# Patient Record
Sex: MALE | Race: WHITE | Employment: FULL TIME | URBAN - METROPOLITAN AREA
[De-identification: names, ages, dates, MRNs, and addresses within clinical notes are randomized per-mention and may not be internally consistent; named-entity substitution may affect disease eponyms.]

---

## 2017-03-21 ENCOUNTER — OFFICE VISIT (OUTPATIENT)
Dept: FAMILY MEDICINE CLINIC | Age: 52
End: 2017-03-21

## 2017-03-21 VITALS
WEIGHT: 177.6 LBS | BODY MASS INDEX: 25.43 KG/M2 | HEIGHT: 70 IN | SYSTOLIC BLOOD PRESSURE: 118 MMHG | HEART RATE: 74 BPM | DIASTOLIC BLOOD PRESSURE: 82 MMHG | OXYGEN SATURATION: 98 %

## 2017-03-21 DIAGNOSIS — Z00.00 PREVENTATIVE HEALTH CARE: Primary | ICD-10-CM

## 2017-03-21 DIAGNOSIS — Z13.1 SCREENING FOR DIABETES MELLITUS (DM): ICD-10-CM

## 2017-03-21 LAB — HBA1C MFR BLD: 5.3 %

## 2017-03-21 PROCEDURE — 99386 PREV VISIT NEW AGE 40-64: CPT | Performed by: FAMILY MEDICINE

## 2017-03-21 PROCEDURE — 83036 HEMOGLOBIN GLYCOSYLATED A1C: CPT | Performed by: FAMILY MEDICINE

## 2017-03-21 ASSESSMENT — ENCOUNTER SYMPTOMS: SHORTNESS OF BREATH: 0

## 2017-03-22 LAB
A/G RATIO: 2.1 (ref 1.1–2.2)
ALBUMIN SERPL-MCNC: 4.8 G/DL (ref 3.4–5)
ALP BLD-CCNC: 88 U/L (ref 40–129)
ALT SERPL-CCNC: 35 U/L (ref 10–40)
ANION GAP SERPL CALCULATED.3IONS-SCNC: 21 MMOL/L (ref 3–16)
AST SERPL-CCNC: 25 U/L (ref 15–37)
BILIRUB SERPL-MCNC: 1 MG/DL (ref 0–1)
BUN BLDV-MCNC: 14 MG/DL (ref 7–20)
CALCIUM SERPL-MCNC: 9.5 MG/DL (ref 8.3–10.6)
CHLORIDE BLD-SCNC: 98 MMOL/L (ref 99–110)
CHOLESTEROL, TOTAL: 208 MG/DL (ref 0–199)
CO2: 24 MMOL/L (ref 21–32)
CREAT SERPL-MCNC: 1.1 MG/DL (ref 0.9–1.3)
GFR AFRICAN AMERICAN: >60
GFR NON-AFRICAN AMERICAN: >60
GLOBULIN: 2.3 G/DL
GLUCOSE BLD-MCNC: 107 MG/DL (ref 70–99)
HDLC SERPL-MCNC: 41 MG/DL (ref 40–60)
HIV-1 AND HIV-2 ANTIBODIES: NORMAL
LDL CHOLESTEROL CALCULATED: 145 MG/DL
POTASSIUM SERPL-SCNC: 4 MMOL/L (ref 3.5–5.1)
SODIUM BLD-SCNC: 143 MMOL/L (ref 136–145)
TOTAL PROTEIN: 7.1 G/DL (ref 6.4–8.2)
TRIGL SERPL-MCNC: 108 MG/DL (ref 0–150)
VLDLC SERPL CALC-MCNC: 22 MG/DL

## 2018-02-09 ENCOUNTER — HOSPITAL ENCOUNTER (OUTPATIENT)
Dept: OTHER | Age: 53
Discharge: HOME | End: 2018-02-09
Attending: INTERNAL MEDICINE | Admitting: INTERNAL MEDICINE

## 2018-02-09 VITALS
SYSTOLIC BLOOD PRESSURE: 123 MMHG | OXYGEN SATURATION: 97 % | TEMPERATURE: 98.5 F | WEIGHT: 175 LBS | HEIGHT: 70 IN | HEART RATE: 72 BPM | RESPIRATION RATE: 16 BRPM | DIASTOLIC BLOOD PRESSURE: 84 MMHG | BODY MASS INDEX: 25.05 KG/M2

## 2018-02-09 RX ORDER — SODIUM CHLORIDE, SODIUM LACTATE, POTASSIUM CHLORIDE, CALCIUM CHLORIDE 600; 310; 30; 20 MG/100ML; MG/100ML; MG/100ML; MG/100ML
INJECTION, SOLUTION INTRAVENOUS ONCE
Status: DISCONTINUED | OUTPATIENT
Start: 2018-02-09 | End: 2018-02-10 | Stop reason: HOSPADM

## 2018-02-09 ASSESSMENT — PAIN - FUNCTIONAL ASSESSMENT: PAIN_FUNCTIONAL_ASSESSMENT: 0-10

## 2018-02-09 ASSESSMENT — PAIN SCALES - GENERAL
PAINLEVEL_OUTOF10: 0

## 2018-02-09 NOTE — PROGRESS NOTES
Pt discharged to home in stable condition. Verbal and written discharge instructions given pt and wife verbalized understanding.

## 2018-02-09 NOTE — PLAN OF CARE
ENDOSCOPY  PRE, INTRA, & POST PROCEDURAL  CARE PLAN    HEMODYNAMIC STATUS  INTERDISCIPLINARY   Goal: Hemodynamic Status to Baseline / Discharge Criteria Met  Interventions     1. Obtain Patient  Medical /  Surgical History     1 Assess & Review Allergies Prior to Endo & All  Meds (PRN)     2. Assess / Monitor Vital Signs / LOC (PRN). Intra Procedure VS/ LOC  Q5 Mins  & As Per Policy Until Discharge. 3. Obtain Baseline Maribel & Post Procedural  Maribel Per   Policy     4. Check Monitors, Alarms On     5. Patient Re Assessed by Physician     6. Monitor  I&O Post Procedure   NURSING   SAFETY & PSYCHO SOCIAL  INTERDISCIPLINARY   Goal: Patient Returns to Baseline Activity/ Meets Discharge Criteria  Interventions     1. Greet Patient with ID Badge/ Picture in View (PRN)     2. Be Available & Sensitive to Patients Needs (PRN)     3. Communicate referral to Pastoral Care as Appropriate (PRN)     4. Provide Age Specific Measures (PRN)     4. Admission Data Base Reviewed     5. Administer Meds Per Orders (PRN)     5. Maintain Baseline Activity  Or Activity as Ordered Per Physician     NUTRITION   INTERDISCIPLINARY   Goal: Patient to baseline/ Improved Nutrition  Interventions     1. Assess NPO Status / Notify Physician if Necessary (PRN)     2. NPO per Physician Orders     3. Assess Nutritional Status (PRN)     4. Assess Ability to Swallow as Indicated     LAB & DIAGNOSTICS   Goal: Additional Tests per Physicians   Orders  Interventions     1. Lab & Diagnostics per Physician Orders (PRN)     2.  Obtain  Urine / Serum HCG & FSBS On All Diabetic Patients  Per Policy & (PRN)     3. Assess Lab Time Out  for  Patient Safety as Needed (PRN)   RESPIRATORY  INTERDISCIPLINARY   Goal: Airway   Patency, SAO2   Saturation, Cough mechanism Maintained   Interventions     1. Evaluate Bilateral Breath Sounds  Baseline, (PRN), & Prior to Discharge     2. Weight & Height Noted ( PRN)     3.   Assess Baseline SPo2 (PRN)

## 2018-02-09 NOTE — PROCEDURES
Gordan Bumpers   2/9/2018  Colonoscopy  A pre-procedure re-evaluation was performed immediately prior to the procedure.   Preprocedure Dx: v76.51  Postprocedure Dx: Normal Colonoscopy  Medications: Procedural sedation with Versed & Fentanyl  Complications: None  Estimated Blood Loss: <5cc  Specimens: were not obtained  Suzie Vilchis

## 2018-02-09 NOTE — H&P
Pre-sedation Assessment    HPI: screening  Nurses notes reviewed and agreed. Medications and Allergies reviewed    Physical Exam:  Airway:Normal  Cardiac:Normal  Pulmonary:Normal  Abdomen:Normal    Assessment/Plan:  ASA Grade 2 - Patient with mild systemic disease with no functional limitations  Level of Sedation Plan: Moderate sedation  Post Procedure plan: Return to same level of care  I have explained the risk, benefits, and alternatives to the procedure. The patient understands and agrees to proceed.   Yes    Winter De Los Santos  11:19 AM 2/9/2018

## 2018-02-09 NOTE — OP NOTE
[address]                  [address] [address]                                 OPERATIVE REPORT    PATIENT NAME: Natty Rizvi                     :        1965  MED REC NO:   [id number]                          ROOM:  ACCOUNT NO:   [de-identified]                          ADMIT DATE: 2018  PROVIDER:     Gian Montesinos MD    DATE OF PROCEDURE:  2018    PREOPERATIVE DIAGNOSIS:  Screening. POSTOPERATIVE DIAGNOSIS:  Normal colon to cecum. PROCEDURE DONE:  Colonoscopy to cecum. SURGEON:  Gian Montesinos MD    INDICATIONS:  This is a 53-year-old referred for screening colonoscopy. Denied any symptoms. Informed consent was obtained. IV sedation was given  with fentanyl and Versed. Continuous cardiac, oxygen, and blood pressure  monitoring done. FINDINGS:  RECTUM:  Normal.  SIGMOID COLON:  Normal.  DESCENDING COLON:  Normal.  TRANSVERSE COLON:  Normal.  ASCENDING COLON TO CECUM:  Normal.    While coming out, the colon was again examined. No other lesions were  seen. IMPRESSION:  Normal colon to cecum. SUGGESTIONS:  Would be to go ahead and have the patient follow up with Dr. Mirna Soares. We will consider repeat colonoscopy in 10 years.         Darcy Jerez MD    D: 2018 12:44:01       T: 2018 13:39:33     [email or web address]  Job#: [id number]     Doc#: [id number]    CC:  Gian Montesinos MD       [address] [place] [address]

## 2018-03-06 ENCOUNTER — OFFICE VISIT (OUTPATIENT)
Dept: FAMILY MEDICINE CLINIC | Age: 53
End: 2018-03-06

## 2018-03-06 VITALS
WEIGHT: 182 LBS | BODY MASS INDEX: 26.11 KG/M2 | HEART RATE: 68 BPM | SYSTOLIC BLOOD PRESSURE: 122 MMHG | OXYGEN SATURATION: 99 % | DIASTOLIC BLOOD PRESSURE: 84 MMHG

## 2018-03-06 DIAGNOSIS — Z00.00 PREVENTATIVE HEALTH CARE: Primary | ICD-10-CM

## 2018-03-06 PROCEDURE — 99396 PREV VISIT EST AGE 40-64: CPT | Performed by: FAMILY MEDICINE

## 2018-03-06 ASSESSMENT — PATIENT HEALTH QUESTIONNAIRE - PHQ9
SUM OF ALL RESPONSES TO PHQ9 QUESTIONS 1 & 2: 2
SUM OF ALL RESPONSES TO PHQ QUESTIONS 1-9: 2
1. LITTLE INTEREST OR PLEASURE IN DOING THINGS: 1
2. FEELING DOWN, DEPRESSED OR HOPELESS: 1

## 2018-03-06 NOTE — PROGRESS NOTES
Taylor Hoffman is a 48 y.o. male    Chief Complaint   Patient presents with    Annual Exam       HPI:    HPI    Preventative care. Tdap: UTD  Shingles vaccine: will get at local pharmacy  HIV screen was negative. Cholesterol was modestly elevated. A1c was 5.3. Colonoscopy was normal with recommendations to repeat in 10 years. ROS:    ROS    /84   Pulse 68   Wt 182 lb (82.6 kg)   SpO2 99%   BMI 26.11 kg/m²     Physical Exam:    Physical Exam   Constitutional: He appears well-developed. No distress. Cardiovascular: Normal rate and regular rhythm. No murmur heard. Pulmonary/Chest: Effort normal and breath sounds normal. No respiratory distress. Skin: He is not diaphoretic. Psychiatric: He has a normal mood and affect. No current outpatient prescriptions on file. No current facility-administered medications for this visit. Assessment:    1. Preventative health care        Plan:    1. Preventative health care  Will try to get shingles vaccine at local pharmacy. Return in about 1 year (around 3/6/2019) for Preventative.

## 2018-08-05 ENCOUNTER — HOSPITAL ENCOUNTER (EMERGENCY)
Age: 53
Discharge: HOME | End: 2018-08-05
Attending: EMERGENCY MEDICINE
Payer: COMMERCIAL

## 2018-08-05 ENCOUNTER — APPOINTMENT (OUTPATIENT)
Dept: CT IMAGING | Age: 53
End: 2018-08-05
Payer: COMMERCIAL

## 2018-08-05 VITALS
HEART RATE: 62 BPM | OXYGEN SATURATION: 97 % | RESPIRATION RATE: 16 BRPM | HEIGHT: 70 IN | SYSTOLIC BLOOD PRESSURE: 115 MMHG | TEMPERATURE: 97.9 F | BODY MASS INDEX: 25.77 KG/M2 | WEIGHT: 180 LBS | DIASTOLIC BLOOD PRESSURE: 66 MMHG

## 2018-08-05 DIAGNOSIS — N20.0 KIDNEY STONE: Primary | ICD-10-CM

## 2018-08-05 LAB
A/G RATIO: 1.7 (ref 1.1–2.2)
ALBUMIN SERPL-MCNC: 4.4 G/DL (ref 3.4–5)
ALP BLD-CCNC: 100 U/L (ref 40–129)
ALT SERPL-CCNC: 18 U/L (ref 10–40)
ANION GAP SERPL CALCULATED.3IONS-SCNC: 12 MMOL/L (ref 3–16)
AST SERPL-CCNC: 19 U/L (ref 15–37)
BACTERIA: ABNORMAL /HPF
BILIRUB SERPL-MCNC: 0.7 MG/DL (ref 0–1)
BILIRUBIN URINE: NEGATIVE
BLOOD, URINE: ABNORMAL
BUN BLDV-MCNC: 17 MG/DL (ref 7–20)
CALCIUM SERPL-MCNC: 9.2 MG/DL (ref 8.3–10.6)
CHLORIDE BLD-SCNC: 102 MMOL/L (ref 99–110)
CLARITY: CLEAR
CO2: 28 MMOL/L (ref 21–32)
COLOR: YELLOW
CREAT SERPL-MCNC: 1.3 MG/DL (ref 0.9–1.3)
EPITHELIAL CELLS, UA: ABNORMAL /HPF
GFR AFRICAN AMERICAN: >60
GFR NON-AFRICAN AMERICAN: 58
GLOBULIN: 2.6 G/DL
GLUCOSE BLD-MCNC: 144 MG/DL (ref 70–99)
GLUCOSE URINE: NEGATIVE MG/DL
KETONES, URINE: NEGATIVE MG/DL
LEUKOCYTE ESTERASE, URINE: NEGATIVE
MICROSCOPIC EXAMINATION: YES
NITRITE, URINE: NEGATIVE
PH UA: 6
POTASSIUM SERPL-SCNC: 4.1 MMOL/L (ref 3.5–5.1)
PROTEIN UA: 30 MG/DL
RBC UA: >100 /HPF (ref 0–2)
SODIUM BLD-SCNC: 142 MMOL/L (ref 136–145)
SPECIFIC GRAVITY UA: >=1.03
TOTAL PROTEIN: 7 G/DL (ref 6.4–8.2)
URINE TYPE: ABNORMAL
UROBILINOGEN, URINE: 0.2 E.U./DL
WBC UA: ABNORMAL /HPF (ref 0–5)

## 2018-08-05 PROCEDURE — 99284 EMERGENCY DEPT VISIT MOD MDM: CPT

## 2018-08-05 PROCEDURE — 6370000000 HC RX 637 (ALT 250 FOR IP): Performed by: EMERGENCY MEDICINE

## 2018-08-05 PROCEDURE — 80053 COMPREHEN METABOLIC PANEL: CPT

## 2018-08-05 PROCEDURE — 81001 URINALYSIS AUTO W/SCOPE: CPT

## 2018-08-05 PROCEDURE — 74176 CT ABD & PELVIS W/O CONTRAST: CPT

## 2018-08-05 PROCEDURE — 2580000003 HC RX 258: Performed by: EMERGENCY MEDICINE

## 2018-08-05 PROCEDURE — 96361 HYDRATE IV INFUSION ADD-ON: CPT

## 2018-08-05 PROCEDURE — 96375 TX/PRO/DX INJ NEW DRUG ADDON: CPT

## 2018-08-05 PROCEDURE — 6360000002 HC RX W HCPCS: Performed by: EMERGENCY MEDICINE

## 2018-08-05 PROCEDURE — 96374 THER/PROPH/DIAG INJ IV PUSH: CPT

## 2018-08-05 RX ORDER — HYDROCODONE BITARTRATE AND ACETAMINOPHEN 5; 325 MG/1; MG/1
1 TABLET ORAL EVERY 6 HOURS PRN
Qty: 8 TABLET | Refills: 0 | Status: SHIPPED | OUTPATIENT
Start: 2018-08-05 | End: 2018-08-07

## 2018-08-05 RX ORDER — ONDANSETRON 8 MG/1
8 TABLET, ORALLY DISINTEGRATING ORAL EVERY 8 HOURS PRN
Qty: 10 TABLET | Refills: 0 | Status: SHIPPED | OUTPATIENT
Start: 2018-08-05

## 2018-08-05 RX ORDER — TAMSULOSIN HYDROCHLORIDE 0.4 MG/1
0.4 CAPSULE ORAL DAILY
Qty: 5 CAPSULE | Refills: 0 | Status: SHIPPED | OUTPATIENT
Start: 2018-08-05

## 2018-08-05 RX ORDER — KETOROLAC TROMETHAMINE 30 MG/ML
15 INJECTION, SOLUTION INTRAMUSCULAR; INTRAVENOUS ONCE
Status: COMPLETED | OUTPATIENT
Start: 2018-08-05 | End: 2018-08-05

## 2018-08-05 RX ORDER — MORPHINE SULFATE 1 MG/ML
4 INJECTION, SOLUTION EPIDURAL; INTRATHECAL; INTRAVENOUS ONCE
Status: COMPLETED | OUTPATIENT
Start: 2018-08-05 | End: 2018-08-05

## 2018-08-05 RX ORDER — 0.9 % SODIUM CHLORIDE 0.9 %
1000 INTRAVENOUS SOLUTION INTRAVENOUS ONCE
Status: COMPLETED | OUTPATIENT
Start: 2018-08-05 | End: 2018-08-05

## 2018-08-05 RX ORDER — CEPHALEXIN 250 MG/1
500 CAPSULE ORAL 4 TIMES DAILY
Qty: 56 CAPSULE | Refills: 0 | Status: SHIPPED | OUTPATIENT
Start: 2018-08-05 | End: 2018-08-12

## 2018-08-05 RX ORDER — ONDANSETRON 2 MG/ML
4 INJECTION INTRAMUSCULAR; INTRAVENOUS EVERY 6 HOURS PRN
Status: DISCONTINUED | OUTPATIENT
Start: 2018-08-05 | End: 2018-08-05 | Stop reason: HOSPADM

## 2018-08-05 RX ORDER — CEPHALEXIN 500 MG/1
500 CAPSULE ORAL ONCE
Status: COMPLETED | OUTPATIENT
Start: 2018-08-05 | End: 2018-08-05

## 2018-08-05 RX ADMIN — KETOROLAC TROMETHAMINE 15 MG: 30 INJECTION, SOLUTION INTRAMUSCULAR at 09:05

## 2018-08-05 RX ADMIN — Medication 4 MG: at 09:05

## 2018-08-05 RX ADMIN — ONDANSETRON 4 MG: 2 INJECTION INTRAMUSCULAR; INTRAVENOUS at 09:05

## 2018-08-05 RX ADMIN — SODIUM CHLORIDE 1000 ML: 9 INJECTION, SOLUTION INTRAVENOUS at 09:38

## 2018-08-05 RX ADMIN — CEPHALEXIN 500 MG: 500 CAPSULE ORAL at 11:14

## 2018-08-05 ASSESSMENT — PAIN SCALES - GENERAL
PAINLEVEL_OUTOF10: 9
PAINLEVEL_OUTOF10: 0
PAINLEVEL_OUTOF10: 9

## 2018-08-05 NOTE — ED PROVIDER NOTES
person, place, and time. He appears well-developed and well-nourished. No distress. HENT:   Head: Normocephalic and atraumatic. Mouth/Throat: Oropharynx is clear and moist.   Eyes: EOM are normal. Pupils are equal, round, and reactive to light. Right eye exhibits no discharge. Left eye exhibits no discharge. Neck: Normal range of motion. Neck supple. No tracheal deviation present. Cardiovascular: Normal rate, regular rhythm, normal heart sounds and intact distal pulses. Exam reveals no gallop and no friction rub. No murmur heard. Pulmonary/Chest: Effort normal and breath sounds normal. No stridor. No respiratory distress. He has no wheezes. He has no rales. He exhibits no tenderness. Abdominal: Soft. He exhibits no distension. There is no tenderness. There is no rigidity, no rebound, no guarding, no tenderness at McBurney's point and negative Gu's sign. Musculoskeletal: Normal range of motion. He exhibits no edema, tenderness or deformity. Neurological: He is alert and oriented to person, place, and time. No cranial nerve deficit. Coordination normal.   Skin: Skin is warm and dry. No rash noted. He is not diaphoretic. No erythema. No pallor. Psychiatric: He has a normal mood and affect. Nursing note and vitals reviewed.       Diagnostics   Labs:  Results for orders placed or performed during the hospital encounter of 08/05/18   Urinalysis   Result Value Ref Range    Color, UA Yellow Straw/Yellow    Clarity, UA Clear Clear    Glucose, Ur Negative Negative mg/dL    Bilirubin Urine Negative Negative    Ketones, Urine Negative Negative mg/dL    Specific Gravity, UA >=1.030 1.005 - 1.030    Blood, Urine LARGE (A) Negative    pH, UA 6.0 5.0 - 8.0    Protein, UA 30 (A) Negative mg/dL    Urobilinogen, Urine 0.2 <2.0 E.U./dL    Nitrite, Urine Negative Negative    Leukocyte Esterase, Urine Negative Negative    Microscopic Examination YES     Urine Type Not Specified N    Comprehensive metabolic panel obstructing stone seen in the mid to distal right ureter at the level of the right SI joint measuring 6 mm. Small stones remain in the right kidney, the largest of which measures 3-4 mm. No intrahepatic ductal dilatation. No perihepatic fluid. Gallbladder appears normal.  A few tiny hypodense nodules are seen in the liver, measuring 8 mm in size or less, incompletely characterized, most likely cyst or hemangioma in the absence of a known primary No peripancreatic inflammatory change GI/Bowel: Mild stool load is seen in the colon. Appendix is normal in caliber. Pelvis: No free fluid in pelvis. No pelvic adenopathy Peritoneum/Retroperitoneum: Atherosclerotic change is seen in the aorta. No aortic aneurysm Bones/Soft Tissues: Tiny periumbilical hernia containing fat is seen. Spurring is seen in the spine. Spurring is seen in the hips. Mild right-sided hydronephrosis and hydroureter. There is an obstructing stone seen in the mid to distal right ureter at the level of the right SI joint measuring 6 mm. Small stones remain in the right kidney, the largest of which measures 3-4 mm. Punctate densities in left kidney, either artifact from dehydration or tiny nonobstructing renal calculi       Procedures/EKG:   Procedures    ED Course and MDM           In brief, Yenny Finnegan is a 48 y.o. male who presented to the emergency department With history and physical exam consistent with likely kidney stone. Patient was uncomfortable and appeared to be unable to find a comfortable position upon arrival.  Patient had a history of kidney stones and states this feels exactly the same as his previous ones. His vital signs are stable with no fever, tachycardia, or hypotension. Abdominal exam was benign. No signs of rash on exam.  UA showed large amount of blood, but no other overt signs of urinary tract infection except for 1+ bacteria. Urine culture has been sent and Keflex has been started.   CT scan showed mild

## 2018-08-05 NOTE — ED NOTES
Dr. Lyudmila Rizzo with urology returned page at [phone] and spoke to Dr. Leo Mae.       Charmayne Loving  08/05/18 [id number]

## 2021-06-25 ENCOUNTER — IMMUNIZATION (OUTPATIENT)
Dept: PRIMARY CARE CLINIC | Age: 56
End: 2021-06-25
Payer: OTHER GOVERNMENT

## 2021-06-25 PROCEDURE — 91300 COVID-19, PFIZER VACCINE 30MCG/0.3ML DOSE: CPT | Performed by: FAMILY MEDICINE

## 2021-06-25 PROCEDURE — 0001A COVID-19, PFIZER VACCINE 30MCG/0.3ML DOSE: CPT | Performed by: FAMILY MEDICINE

## 2021-07-16 ENCOUNTER — IMMUNIZATION (OUTPATIENT)
Dept: PRIMARY CARE CLINIC | Age: 56
End: 2021-07-16
Payer: OTHER GOVERNMENT

## 2021-07-16 PROCEDURE — 0002A COVID-19, PFIZER VACCINE 30MCG/0.3ML DOSE: CPT | Performed by: FAMILY MEDICINE

## 2021-07-16 PROCEDURE — 91300 COVID-19, PFIZER VACCINE 30MCG/0.3ML DOSE: CPT | Performed by: FAMILY MEDICINE

## 2023-05-20 SDOH — HEALTH STABILITY: PHYSICAL HEALTH: ON AVERAGE, HOW MANY DAYS PER WEEK DO YOU ENGAGE IN MODERATE TO STRENUOUS EXERCISE (LIKE A BRISK WALK)?: 6 DAYS

## 2023-05-20 SDOH — HEALTH STABILITY: PHYSICAL HEALTH: ON AVERAGE, HOW MANY MINUTES DO YOU ENGAGE IN EXERCISE AT THIS LEVEL?: 20 MIN

## 2023-05-23 ENCOUNTER — OFFICE VISIT (OUTPATIENT)
Dept: FAMILY MEDICINE CLINIC | Age: 58
End: 2023-05-23
Payer: COMMERCIAL

## 2023-05-23 VITALS
BODY MASS INDEX: 25.08 KG/M2 | WEIGHT: 175.2 LBS | OXYGEN SATURATION: 98 % | DIASTOLIC BLOOD PRESSURE: 78 MMHG | SYSTOLIC BLOOD PRESSURE: 120 MMHG | HEIGHT: 70 IN | HEART RATE: 83 BPM

## 2023-05-23 DIAGNOSIS — Z00.00 ANNUAL PHYSICAL EXAM: ICD-10-CM

## 2023-05-23 DIAGNOSIS — Z76.89 ENCOUNTER TO ESTABLISH CARE: Primary | ICD-10-CM

## 2023-05-23 PROBLEM — N20.0 KIDNEY STONE: Status: ACTIVE | Noted: 2018-08-05

## 2023-05-23 PROCEDURE — 99386 PREV VISIT NEW AGE 40-64: CPT | Performed by: STUDENT IN AN ORGANIZED HEALTH CARE EDUCATION/TRAINING PROGRAM

## 2023-05-23 SDOH — ECONOMIC STABILITY: FOOD INSECURITY: WITHIN THE PAST 12 MONTHS, YOU WORRIED THAT YOUR FOOD WOULD RUN OUT BEFORE YOU GOT MONEY TO BUY MORE.: NEVER TRUE

## 2023-05-23 SDOH — ECONOMIC STABILITY: HOUSING INSECURITY
IN THE LAST 12 MONTHS, WAS THERE A TIME WHEN YOU DID NOT HAVE A STEADY PLACE TO SLEEP OR SLEPT IN A SHELTER (INCLUDING NOW)?: NO

## 2023-05-23 SDOH — ECONOMIC STABILITY: INCOME INSECURITY: HOW HARD IS IT FOR YOU TO PAY FOR THE VERY BASICS LIKE FOOD, HOUSING, MEDICAL CARE, AND HEATING?: NOT HARD AT ALL

## 2023-05-23 SDOH — ECONOMIC STABILITY: FOOD INSECURITY: WITHIN THE PAST 12 MONTHS, THE FOOD YOU BOUGHT JUST DIDN'T LAST AND YOU DIDN'T HAVE MONEY TO GET MORE.: NEVER TRUE

## 2023-05-23 ASSESSMENT — PATIENT HEALTH QUESTIONNAIRE - PHQ9
9. THOUGHTS THAT YOU WOULD BE BETTER OFF DEAD, OR OF HURTING YOURSELF: 0
SUM OF ALL RESPONSES TO PHQ9 QUESTIONS 1 & 2: 0
7. TROUBLE CONCENTRATING ON THINGS, SUCH AS READING THE NEWSPAPER OR WATCHING TELEVISION: 0
SUM OF ALL RESPONSES TO PHQ QUESTIONS 1-9: 0
SUM OF ALL RESPONSES TO PHQ QUESTIONS 1-9: 0
8. MOVING OR SPEAKING SO SLOWLY THAT OTHER PEOPLE COULD HAVE NOTICED. OR THE OPPOSITE, BEING SO FIGETY OR RESTLESS THAT YOU HAVE BEEN MOVING AROUND A LOT MORE THAN USUAL: 0
2. FEELING DOWN, DEPRESSED OR HOPELESS: 0
SUM OF ALL RESPONSES TO PHQ QUESTIONS 1-9: 0
5. POOR APPETITE OR OVEREATING: 0
1. LITTLE INTEREST OR PLEASURE IN DOING THINGS: 0
10. IF YOU CHECKED OFF ANY PROBLEMS, HOW DIFFICULT HAVE THESE PROBLEMS MADE IT FOR YOU TO DO YOUR WORK, TAKE CARE OF THINGS AT HOME, OR GET ALONG WITH OTHER PEOPLE: 0
4. FEELING TIRED OR HAVING LITTLE ENERGY: 0
3. TROUBLE FALLING OR STAYING ASLEEP: 0
6. FEELING BAD ABOUT YOURSELF - OR THAT YOU ARE A FAILURE OR HAVE LET YOURSELF OR YOUR FAMILY DOWN: 0
SUM OF ALL RESPONSES TO PHQ QUESTIONS 1-9: 0

## 2023-05-23 ASSESSMENT — ANXIETY QUESTIONNAIRES
2. NOT BEING ABLE TO STOP OR CONTROL WORRYING: 0
1. FEELING NERVOUS, ANXIOUS, OR ON EDGE: 0
7. FEELING AFRAID AS IF SOMETHING AWFUL MIGHT HAPPEN: 0
IF YOU CHECKED OFF ANY PROBLEMS ON THIS QUESTIONNAIRE, HOW DIFFICULT HAVE THESE PROBLEMS MADE IT FOR YOU TO DO YOUR WORK, TAKE CARE OF THINGS AT HOME, OR GET ALONG WITH OTHER PEOPLE: NOT DIFFICULT AT ALL
3. WORRYING TOO MUCH ABOUT DIFFERENT THINGS: 0
5. BEING SO RESTLESS THAT IT IS HARD TO SIT STILL: 0
GAD7 TOTAL SCORE: 0
4. TROUBLE RELAXING: 0
6. BECOMING EASILY ANNOYED OR IRRITABLE: 0

## 2023-05-25 DIAGNOSIS — Z00.00 ANNUAL PHYSICAL EXAM: ICD-10-CM

## 2023-05-25 LAB
ALBUMIN SERPL-MCNC: 4.5 G/DL (ref 3.4–5)
ALBUMIN/GLOB SERPL: 2 {RATIO} (ref 1.1–2.2)
ALP SERPL-CCNC: 84 U/L (ref 40–129)
ALT SERPL-CCNC: 26 U/L (ref 10–40)
ANION GAP SERPL CALCULATED.3IONS-SCNC: 12 MMOL/L (ref 3–16)
AST SERPL-CCNC: 20 U/L (ref 15–37)
BILIRUB SERPL-MCNC: 0.7 MG/DL (ref 0–1)
BUN SERPL-MCNC: 18 MG/DL (ref 7–20)
CALCIUM SERPL-MCNC: 9.6 MG/DL (ref 8.3–10.6)
CHLORIDE SERPL-SCNC: 102 MMOL/L (ref 99–110)
CHOLEST SERPL-MCNC: 223 MG/DL (ref 0–199)
CO2 SERPL-SCNC: 29 MMOL/L (ref 21–32)
CREAT SERPL-MCNC: 1.2 MG/DL (ref 0.9–1.3)
DEPRECATED RDW RBC AUTO: 13.5 % (ref 12.4–15.4)
GFR SERPLBLD CREATININE-BSD FMLA CKD-EPI: >60 ML/MIN/{1.73_M2}
GLUCOSE SERPL-MCNC: 113 MG/DL (ref 70–99)
HCT VFR BLD AUTO: 45.3 % (ref 40.5–52.5)
HCV AB SERPL QL IA: NORMAL
HDLC SERPL-MCNC: 40 MG/DL (ref 40–60)
HGB BLD-MCNC: 15.6 G/DL (ref 13.5–17.5)
LDL CHOLESTEROL CALCULATED: 157 MG/DL
MCH RBC QN AUTO: 29.7 PG (ref 26–34)
MCHC RBC AUTO-ENTMCNC: 34.6 G/DL (ref 31–36)
MCV RBC AUTO: 85.9 FL (ref 80–100)
PLATELET # BLD AUTO: 268 K/UL (ref 135–450)
PMV BLD AUTO: 8.5 FL (ref 5–10.5)
POTASSIUM SERPL-SCNC: 4.5 MMOL/L (ref 3.5–5.1)
PROT SERPL-MCNC: 6.8 G/DL (ref 6.4–8.2)
PSA SERPL DL<=0.01 NG/ML-MCNC: 2.4 NG/ML (ref 0–4)
RBC # BLD AUTO: 5.27 M/UL (ref 4.2–5.9)
SODIUM SERPL-SCNC: 143 MMOL/L (ref 136–145)
TRIGL SERPL-MCNC: 131 MG/DL (ref 0–150)
VLDLC SERPL CALC-MCNC: 26 MG/DL
WBC # BLD AUTO: 4.4 K/UL (ref 4–11)

## 2023-05-26 DIAGNOSIS — E78.00 PURE HYPERCHOLESTEROLEMIA: Primary | ICD-10-CM

## 2023-05-26 LAB
EST. AVERAGE GLUCOSE BLD GHB EST-MCNC: 105.4 MG/DL
HBA1C MFR BLD: 5.3 %

## 2023-05-26 RX ORDER — ATORVASTATIN CALCIUM 40 MG/1
40 TABLET, FILM COATED ORAL DAILY
Qty: 30 TABLET | Refills: 3 | Status: SHIPPED | OUTPATIENT
Start: 2023-05-26

## 2023-07-21 ENCOUNTER — PATIENT MESSAGE (OUTPATIENT)
Dept: PRIMARY CARE CLINIC | Age: 58
End: 2023-07-21

## 2023-07-21 NOTE — TELEPHONE ENCOUNTER
From: Delores Grajeda  To: Dr. Arias Sell: 7/21/2023 5:33 PM EDT  Subject: Medical follow up. I think I remember after follow up phone call about the medication recommendation, that I would need blood drawn after a couple of months. Do I need to do that? It almost 2 months on the medication and do I need a doctors order to get blood work done?

## 2023-07-28 DIAGNOSIS — E78.00 PURE HYPERCHOLESTEROLEMIA: ICD-10-CM

## 2023-07-28 LAB
ALBUMIN SERPL-MCNC: 4.6 G/DL (ref 3.4–5)
ALP SERPL-CCNC: 80 U/L (ref 40–129)
ALT SERPL-CCNC: 52 U/L (ref 10–40)
AST SERPL-CCNC: 27 U/L (ref 15–37)
BILIRUB DIRECT SERPL-MCNC: <0.2 MG/DL (ref 0–0.3)
BILIRUB INDIRECT SERPL-MCNC: ABNORMAL MG/DL (ref 0–1)
BILIRUB SERPL-MCNC: 0.8 MG/DL (ref 0–1)
PROT SERPL-MCNC: 6.8 G/DL (ref 6.4–8.2)

## 2023-09-17 DIAGNOSIS — E78.00 PURE HYPERCHOLESTEROLEMIA: ICD-10-CM

## 2023-09-17 NOTE — TELEPHONE ENCOUNTER
Refill Request     CONFIRM preferred pharmacy with the patient. If Mail Order Rx - Pend for 90 day refill. Last Seen: Last Seen Department: Visit date not found  Last Seen by PCP: 5/23/2023    Last Written: 5/26/2023 30 tab 3 refills    If no future appointment scheduled:  Review the last OV with PCP and review information for follow-up visit,  Route STAFF MESSAGE with patient name to the [facility] for scheduling with the following information:            -  Timing of next visit           -  Visit type ie Physical, OV, etc           -  Diagnoses/Reason ie. COPD, HTN - Do not use MEDICATION, Follow-up or CHECK UP - Give reason for visit      Next Appointment:   Future Appointments   Date Time Provider [address]   5/28/2024  3:30 PM Gladis Robert MD [address] [facility]       Message sent to [address] [place] [address] to schedule appt with patient?   NO      Requested Prescriptions     Pending Prescriptions Disp Refills    atorvastatin (LIPITOR) 40 MG tablet 30 tablet 3     Sig: Take 1 tablet by mouth daily

## 2023-09-18 RX ORDER — ATORVASTATIN CALCIUM 40 MG/1
40 TABLET, FILM COATED ORAL DAILY
Qty: 30 TABLET | Refills: 3 | Status: SHIPPED | OUTPATIENT
Start: 2023-09-18

## 2024-05-25 ASSESSMENT — PATIENT HEALTH QUESTIONNAIRE - PHQ9
SUM OF ALL RESPONSES TO PHQ QUESTIONS 1-9: 0
SUM OF ALL RESPONSES TO PHQ QUESTIONS 1-9: 0
1. LITTLE INTEREST OR PLEASURE IN DOING THINGS: NOT AT ALL
SUM OF ALL RESPONSES TO PHQ9 QUESTIONS 1 & 2: 0
1. LITTLE INTEREST OR PLEASURE IN DOING THINGS: NOT AT ALL
SUM OF ALL RESPONSES TO PHQ QUESTIONS 1-9: 0
2. FEELING DOWN, DEPRESSED OR HOPELESS: NOT AT ALL
2. FEELING DOWN, DEPRESSED OR HOPELESS: NOT AT ALL
SUM OF ALL RESPONSES TO PHQ QUESTIONS 1-9: 0
SUM OF ALL RESPONSES TO PHQ9 QUESTIONS 1 & 2: 0

## 2024-05-28 ENCOUNTER — OFFICE VISIT (OUTPATIENT)
Dept: PRIMARY CARE CLINIC | Age: 59
End: 2024-05-28
Payer: COMMERCIAL

## 2024-05-28 VITALS
DIASTOLIC BLOOD PRESSURE: 76 MMHG | BODY MASS INDEX: 27.67 KG/M2 | HEART RATE: 69 BPM | OXYGEN SATURATION: 97 % | WEIGHT: 186.8 LBS | TEMPERATURE: 98 F | HEIGHT: 69 IN | SYSTOLIC BLOOD PRESSURE: 130 MMHG

## 2024-05-28 DIAGNOSIS — R01.1 NEWLY RECOGNIZED HEART MURMUR: ICD-10-CM

## 2024-05-28 DIAGNOSIS — Z00.00 ANNUAL PHYSICAL EXAM: Primary | ICD-10-CM

## 2024-05-28 DIAGNOSIS — E78.00 PURE HYPERCHOLESTEROLEMIA: ICD-10-CM

## 2024-05-28 DIAGNOSIS — R01.1 HEART MURMUR: ICD-10-CM

## 2024-05-28 PROCEDURE — 99213 OFFICE O/P EST LOW 20 MIN: CPT | Performed by: STUDENT IN AN ORGANIZED HEALTH CARE EDUCATION/TRAINING PROGRAM

## 2024-05-28 PROCEDURE — 99396 PREV VISIT EST AGE 40-64: CPT | Performed by: STUDENT IN AN ORGANIZED HEALTH CARE EDUCATION/TRAINING PROGRAM

## 2024-05-28 RX ORDER — ATORVASTATIN CALCIUM 40 MG/1
40 TABLET, FILM COATED ORAL DAILY
Qty: 90 TABLET | Refills: 3 | Status: SHIPPED | OUTPATIENT
Start: 2024-05-28

## 2024-05-28 SDOH — ECONOMIC STABILITY: FOOD INSECURITY: WITHIN THE PAST 12 MONTHS, THE FOOD YOU BOUGHT JUST DIDN'T LAST AND YOU DIDN'T HAVE MONEY TO GET MORE.: NEVER TRUE

## 2024-05-28 SDOH — ECONOMIC STABILITY: INCOME INSECURITY: HOW HARD IS IT FOR YOU TO PAY FOR THE VERY BASICS LIKE FOOD, HOUSING, MEDICAL CARE, AND HEATING?: NOT HARD AT ALL

## 2024-05-28 SDOH — ECONOMIC STABILITY: FOOD INSECURITY: WITHIN THE PAST 12 MONTHS, YOU WORRIED THAT YOUR FOOD WOULD RUN OUT BEFORE YOU GOT MONEY TO BUY MORE.: NEVER TRUE

## 2024-05-28 NOTE — PROGRESS NOTES
Concern    Not on file   Social History Narrative    Not on file     Social Determinants of Health     Financial Resource Strain: Low Risk  (5/28/2024)    Overall Financial Resource Strain (CARDIA)     Difficulty of Paying Living Expenses: Not hard at all   Food Insecurity: No Food Insecurity (5/28/2024)    Hunger Vital Sign     Worried About Running Out of Food in the Last Year: Never true     Ran Out of Food in the Last Year: Never true   Transportation Needs: Unknown (5/28/2024)    PRAPARE - Transportation     Lack of Transportation (Medical): Not on file     Lack of Transportation (Non-Medical): No   Physical Activity: Insufficiently Active (5/20/2023)    Exercise Vital Sign     Days of Exercise per Week: 6 days     Minutes of Exercise per Session: 20 min   Stress: Not on file   Social Connections: Not on file   Intimate Partner Violence: Not At Risk (5/20/2023)    Humiliation, Afraid, Rape, and Kick questionnaire     Fear of Current or Ex-Partner: No     Emotionally Abused: No     Physically Abused: No     Sexually Abused: No   Housing Stability: Unknown (5/28/2024)    Housing Stability Vital Sign     Unable to Pay for Housing in the Last Year: Not on file     Number of Places Lived in the Last Year: Not on file     Unstable Housing in the Last Year: No       Review of Systems:  Review of Systems  SEE HPI    Physical Exam:   Vitals:    05/28/24 1520   BP: 130/76   Site: Left Upper Arm   Position: Sitting   Cuff Size: Small Adult   Pulse: 69   Temp: 98 °F (36.7 °C)   TempSrc: Oral   SpO2: 97%   Weight: 84.7 kg (186 lb 12.8 oz)   Height: 1.757 m (5' 9.17\")     Body mass index is 27.45 kg/m².  Physical Exam  Constitutional:       Appearance: Normal appearance.   HENT:      Head: Atraumatic.      Right Ear: External ear normal.      Left Ear: External ear normal.      Nose: Nose normal.      Mouth/Throat:      Mouth: Mucous membranes are moist.   Eyes:      Extraocular Movements: Extraocular movements intact.

## 2024-05-31 DIAGNOSIS — Z00.00 ANNUAL PHYSICAL EXAM: ICD-10-CM

## 2024-05-31 DIAGNOSIS — E78.00 PURE HYPERCHOLESTEROLEMIA: ICD-10-CM

## 2024-05-31 LAB
ALBUMIN SERPL-MCNC: 4.3 G/DL (ref 3.4–5)
ALBUMIN/GLOB SERPL: 1.7 {RATIO} (ref 1.1–2.2)
ALP SERPL-CCNC: 81 U/L (ref 40–129)
ALT SERPL-CCNC: 42 U/L (ref 10–40)
ANION GAP SERPL CALCULATED.3IONS-SCNC: 8 MMOL/L (ref 3–16)
AST SERPL-CCNC: 28 U/L (ref 15–37)
BILIRUB SERPL-MCNC: 0.6 MG/DL (ref 0–1)
BUN SERPL-MCNC: 24 MG/DL (ref 7–20)
CALCIUM SERPL-MCNC: 9.7 MG/DL (ref 8.3–10.6)
CHLORIDE SERPL-SCNC: 100 MMOL/L (ref 99–110)
CHOLEST SERPL-MCNC: 212 MG/DL (ref 0–199)
CO2 SERPL-SCNC: 31 MMOL/L (ref 21–32)
CREAT SERPL-MCNC: 1.3 MG/DL (ref 0.9–1.3)
DEPRECATED RDW RBC AUTO: 13 % (ref 12.4–15.4)
GFR SERPLBLD CREATININE-BSD FMLA CKD-EPI: 64 ML/MIN/{1.73_M2}
GLUCOSE SERPL-MCNC: 114 MG/DL (ref 70–99)
HCT VFR BLD AUTO: 43.8 % (ref 40.5–52.5)
HDLC SERPL-MCNC: 43 MG/DL (ref 40–60)
HGB BLD-MCNC: 15.5 G/DL (ref 13.5–17.5)
LDL CHOLESTEROL: 140 MG/DL
MCH RBC QN AUTO: 30.8 PG (ref 26–34)
MCHC RBC AUTO-ENTMCNC: 35.5 G/DL (ref 31–36)
MCV RBC AUTO: 86.8 FL (ref 80–100)
PLATELET # BLD AUTO: 210 K/UL (ref 135–450)
PMV BLD AUTO: 8.6 FL (ref 5–10.5)
POTASSIUM SERPL-SCNC: 4.5 MMOL/L (ref 3.5–5.1)
PROT SERPL-MCNC: 6.8 G/DL (ref 6.4–8.2)
RBC # BLD AUTO: 5.05 M/UL (ref 4.2–5.9)
SODIUM SERPL-SCNC: 139 MMOL/L (ref 136–145)
TRIGL SERPL-MCNC: 144 MG/DL (ref 0–150)
VLDLC SERPL CALC-MCNC: 29 MG/DL
WBC # BLD AUTO: 5 K/UL (ref 4–11)

## 2024-06-01 LAB
EST. AVERAGE GLUCOSE BLD GHB EST-MCNC: 99.7 MG/DL
HBA1C MFR BLD: 5.1 %

## 2024-06-04 PROBLEM — R01.1 HEART MURMUR: Status: ACTIVE | Noted: 2024-06-04

## 2024-06-04 PROBLEM — E78.00 PURE HYPERCHOLESTEROLEMIA: Status: ACTIVE | Noted: 2024-06-04

## 2024-06-19 ENCOUNTER — HOSPITAL ENCOUNTER (OUTPATIENT)
Dept: CARDIOLOGY | Age: 59
Discharge: HOME | End: 2024-06-21
Attending: STUDENT IN AN ORGANIZED HEALTH CARE EDUCATION/TRAINING PROGRAM
Payer: COMMERCIAL

## 2024-06-19 VITALS
DIASTOLIC BLOOD PRESSURE: 76 MMHG | SYSTOLIC BLOOD PRESSURE: 130 MMHG | WEIGHT: 186 LBS | BODY MASS INDEX: 27.55 KG/M2 | HEIGHT: 69 IN

## 2024-06-19 DIAGNOSIS — R01.1 HEART MURMUR: ICD-10-CM

## 2024-06-19 DIAGNOSIS — R01.1 NEWLY RECOGNIZED HEART MURMUR: ICD-10-CM

## 2024-06-19 LAB
ECHO AO ASC DIAM: 2.7 CM
ECHO AO ASCENDING AORTA INDEX: 1.35 CM/M2
ECHO AO ROOT DIAM: 3.4 CM
ECHO AO ROOT INDEX: 1.7 CM/M2
ECHO AV AREA PEAK VELOCITY: 3 CM2
ECHO AV AREA VTI: 2.9 CM2
ECHO AV AREA/BSA PEAK VELOCITY: 1.5 CM2/M2
ECHO AV AREA/BSA VTI: 1.5 CM2/M2
ECHO AV MEAN GRADIENT: 3 MMHG
ECHO AV MEAN VELOCITY: 0.8 M/S
ECHO AV PEAK GRADIENT: 5 MMHG
ECHO AV PEAK VELOCITY: 1.1 M/S
ECHO AV VELOCITY RATIO: 1
ECHO AV VTI: 21.5 CM
ECHO BSA: 2.03 M2
ECHO EST RA PRESSURE: 3 MMHG
ECHO LA AREA 2C: 16.7 CM2
ECHO LA AREA 4C: 17.5 CM2
ECHO LA MAJOR AXIS: 4.9 CM
ECHO LA MINOR AXIS: 5.1 CM
ECHO LA VOL BP: 47 ML (ref 18–58)
ECHO LA VOL MOD A2C: 43 ML (ref 18–58)
ECHO LA VOL MOD A4C: 48 ML (ref 18–58)
ECHO LA VOL/BSA BIPLANE: 24 ML/M2 (ref 16–34)
ECHO LA VOLUME INDEX MOD A2C: 22 ML/M2 (ref 16–34)
ECHO LA VOLUME INDEX MOD A4C: 24 ML/M2 (ref 16–34)
ECHO LV E' LATERAL VELOCITY: 13 CM/S
ECHO LV E' SEPTAL VELOCITY: 8 CM/S
ECHO LV FRACTIONAL SHORTENING: 33 % (ref 28–44)
ECHO LV INTERNAL DIMENSION DIASTOLE INDEX: 2.25 CM/M2
ECHO LV INTERNAL DIMENSION DIASTOLIC: 4.5 CM (ref 4.2–5.9)
ECHO LV INTERNAL DIMENSION SYSTOLIC INDEX: 1.5 CM/M2
ECHO LV INTERNAL DIMENSION SYSTOLIC: 3 CM
ECHO LV ISOVOLUMETRIC RELAXATION TIME (IVRT): 85 MS
ECHO LV IVSD: 0.8 CM (ref 0.6–1)
ECHO LV MASS 2D: 104.5 G (ref 88–224)
ECHO LV MASS INDEX 2D: 52.2 G/M2 (ref 49–115)
ECHO LV POSTERIOR WALL DIASTOLIC: 0.7 CM (ref 0.6–1)
ECHO LV RELATIVE WALL THICKNESS RATIO: 0.31
ECHO LVOT AREA: 3.1 CM2
ECHO LVOT AV VTI INDEX: 0.92
ECHO LVOT DIAM: 2 CM
ECHO LVOT MEAN GRADIENT: 2 MMHG
ECHO LVOT PEAK GRADIENT: 4 MMHG
ECHO LVOT PEAK VELOCITY: 1.1 M/S
ECHO LVOT STROKE VOLUME INDEX: 30.9 ML/M2
ECHO LVOT SV: 61.9 ML
ECHO LVOT VTI: 19.7 CM
ECHO MV A VELOCITY: 0.47 M/S
ECHO MV E DECELERATION TIME (DT): 224 MS
ECHO MV E VELOCITY: 0.57 M/S
ECHO MV E/A RATIO: 1.21
ECHO MV E/E' LATERAL: 4.38
ECHO MV E/E' RATIO (AVERAGED): 5.75
ECHO MV E/E' SEPTAL: 7.13
ECHO RIGHT VENTRICULAR SYSTOLIC PRESSURE (RVSP): 25 MMHG
ECHO RV TAPSE: 2.7 CM (ref 1.7–?)
ECHO TV REGURGITANT MAX VELOCITY: 2.35 M/S
ECHO TV REGURGITANT PEAK GRADIENT: 22 MMHG

## 2024-06-19 PROCEDURE — 93325 DOPPLER ECHO COLOR FLOW MAPG: CPT

## 2025-05-26 ASSESSMENT — PATIENT HEALTH QUESTIONNAIRE - PHQ9
1. LITTLE INTEREST OR PLEASURE IN DOING THINGS: NOT AT ALL
2. FEELING DOWN, DEPRESSED OR HOPELESS: NOT AT ALL
SUM OF ALL RESPONSES TO PHQ QUESTIONS 1-9: 0
SUM OF ALL RESPONSES TO PHQ9 QUESTIONS 1 & 2: 0
2. FEELING DOWN, DEPRESSED OR HOPELESS: NOT AT ALL
1. LITTLE INTEREST OR PLEASURE IN DOING THINGS: NOT AT ALL

## 2025-05-29 ENCOUNTER — OFFICE VISIT (OUTPATIENT)
Dept: PRIMARY CARE CLINIC | Age: 60
End: 2025-05-29
Payer: COMMERCIAL

## 2025-05-29 VITALS
SYSTOLIC BLOOD PRESSURE: 114 MMHG | BODY MASS INDEX: 26.81 KG/M2 | OXYGEN SATURATION: 97 % | DIASTOLIC BLOOD PRESSURE: 72 MMHG | HEART RATE: 74 BPM | WEIGHT: 181 LBS | HEIGHT: 69 IN

## 2025-05-29 DIAGNOSIS — R01.1 HEART MURMUR: ICD-10-CM

## 2025-05-29 DIAGNOSIS — M25.861 CYST OF RIGHT KNEE JOINT: ICD-10-CM

## 2025-05-29 DIAGNOSIS — E78.00 PURE HYPERCHOLESTEROLEMIA: ICD-10-CM

## 2025-05-29 DIAGNOSIS — Z00.00 ANNUAL PHYSICAL EXAM: Primary | ICD-10-CM

## 2025-05-29 PROCEDURE — 90471 IMMUNIZATION ADMIN: CPT | Performed by: STUDENT IN AN ORGANIZED HEALTH CARE EDUCATION/TRAINING PROGRAM

## 2025-05-29 PROCEDURE — 99396 PREV VISIT EST AGE 40-64: CPT | Performed by: STUDENT IN AN ORGANIZED HEALTH CARE EDUCATION/TRAINING PROGRAM

## 2025-05-29 PROCEDURE — 90750 HZV VACC RECOMBINANT IM: CPT | Performed by: STUDENT IN AN ORGANIZED HEALTH CARE EDUCATION/TRAINING PROGRAM

## 2025-05-29 RX ORDER — M-VIT,TX,IRON,MINS/CALC/FOLIC 27MG-0.4MG
1 TABLET ORAL DAILY
COMMUNITY

## 2025-05-29 SDOH — ECONOMIC STABILITY: FOOD INSECURITY: WITHIN THE PAST 12 MONTHS, THE FOOD YOU BOUGHT JUST DIDN'T LAST AND YOU DIDN'T HAVE MONEY TO GET MORE.: NEVER TRUE

## 2025-05-29 SDOH — ECONOMIC STABILITY: FOOD INSECURITY: WITHIN THE PAST 12 MONTHS, YOU WORRIED THAT YOUR FOOD WOULD RUN OUT BEFORE YOU GOT MONEY TO BUY MORE.: NEVER TRUE

## 2025-05-29 ASSESSMENT — ENCOUNTER SYMPTOMS
NAUSEA: 0
ABDOMINAL PAIN: 0
DIARRHEA: 0
CONSTIPATION: 0
VOMITING: 0
SHORTNESS OF BREATH: 0

## 2025-05-29 NOTE — PROGRESS NOTES
HENT:      Head: Normocephalic and atraumatic.      Right Ear: External ear normal.      Left Ear: External ear normal.   Eyes:      General: No scleral icterus.        Right eye: No discharge.         Left eye: No discharge.      Extraocular Movements: Extraocular movements intact.      Conjunctiva/sclera: Conjunctivae normal.   Cardiovascular:      Rate and Rhythm: Normal rate and regular rhythm.      Heart sounds: Murmur (grade 1/6 systolic) heard.   Pulmonary:      Effort: Pulmonary effort is normal.      Breath sounds: Normal breath sounds.   Abdominal:      General: Abdomen is flat. There is no distension.      Palpations: Abdomen is soft.      Tenderness: There is no abdominal tenderness.   Musculoskeletal:         General: Normal range of motion.      Cervical back: Normal range of motion.   Neurological:      General: No focal deficit present.      Mental Status: He is alert. Mental status is at baseline.   Psychiatric:         Mood and Affect: Mood normal.         Behavior: Behavior normal.         Thought Content: Thought content normal.         Judgment: Judgment normal.         Assessment/Plan:  1. Annual physical exam  -med rec completed  -hcc updated  -cancer screening utd  -declined prevnar 20    2. Pure hypercholesterolemia  -cont atorvastatin 40 mg qd  -repeat lipid panel  -repeat liver enzyme    3. Heart murmur  -benign, stable  -continue to monitor    4. Cyst of right knee joint  -will refer to orthopedic  - Mohan Causey MD, Orthopedic Surgery (Hip; Knee; Shoulder), [facility]      While assessing care for this patient, I have reviewed all pertinent lab work/imaging/ specialist notes and care in reference to those problems addressed above in detail. Appropriate medical decision making was based on this.     Please note that portions of this note may have been completed with a voice recognition program. Efforts were made to edit the dictations but occasionally words are

## 2025-05-30 DIAGNOSIS — R01.1 HEART MURMUR: ICD-10-CM

## 2025-05-30 DIAGNOSIS — Z00.00 ANNUAL PHYSICAL EXAM: ICD-10-CM

## 2025-05-30 DIAGNOSIS — E78.00 PURE HYPERCHOLESTEROLEMIA: ICD-10-CM

## 2025-05-31 LAB
ALBUMIN SERPL-MCNC: 4.5 G/DL (ref 3.4–5)
ALBUMIN/GLOB SERPL: 2 {RATIO} (ref 1.1–2.2)
ALP SERPL-CCNC: 90 U/L (ref 40–129)
ALT SERPL-CCNC: 46 U/L (ref 10–40)
ANION GAP SERPL CALCULATED.3IONS-SCNC: 11 MMOL/L (ref 3–16)
AST SERPL-CCNC: 29 U/L (ref 15–37)
BILIRUB SERPL-MCNC: 1.5 MG/DL (ref 0–1)
BUN SERPL-MCNC: 23 MG/DL (ref 7–20)
CALCIUM SERPL-MCNC: 9.4 MG/DL (ref 8.3–10.6)
CHLORIDE SERPL-SCNC: 99 MMOL/L (ref 99–110)
CHOLEST SERPL-MCNC: 129 MG/DL (ref 0–199)
CO2 SERPL-SCNC: 28 MMOL/L (ref 21–32)
CREAT SERPL-MCNC: 1.4 MG/DL (ref 0.9–1.3)
GFR SERPLBLD CREATININE-BSD FMLA CKD-EPI: 58 ML/MIN/{1.73_M2}
GLUCOSE SERPL-MCNC: 103 MG/DL (ref 70–99)
HDLC SERPL-MCNC: 49 MG/DL (ref 40–60)
LDLC SERPL CALC-MCNC: 64 MG/DL
POTASSIUM SERPL-SCNC: 4.2 MMOL/L (ref 3.5–5.1)
PROT SERPL-MCNC: 6.7 G/DL (ref 6.4–8.2)
SODIUM SERPL-SCNC: 138 MMOL/L (ref 136–145)
TRIGL SERPL-MCNC: 79 MG/DL (ref 0–150)
VLDLC SERPL CALC-MCNC: 16 MG/DL

## 2025-06-03 ENCOUNTER — RESULTS FOLLOW-UP (OUTPATIENT)
Dept: PRIMARY CARE CLINIC | Age: 60
End: 2025-06-03

## 2025-06-19 ENCOUNTER — OFFICE VISIT (OUTPATIENT)
Dept: ORTHOPEDIC SURGERY | Age: 60
End: 2025-06-19
Payer: COMMERCIAL

## 2025-06-19 VITALS — WEIGHT: 181 LBS | HEIGHT: 69 IN | BODY MASS INDEX: 26.81 KG/M2

## 2025-06-19 DIAGNOSIS — R52 PAIN: Primary | ICD-10-CM

## 2025-06-19 DIAGNOSIS — R22.41 KNEE MASS, RIGHT: ICD-10-CM

## 2025-06-19 PROCEDURE — 99203 OFFICE O/P NEW LOW 30 MIN: CPT | Performed by: ORTHOPAEDIC SURGERY

## 2025-06-19 NOTE — PROGRESS NOTES
Dr Mohan Duke      Date /Time 6/19/2025       4:13 PM EDT  Name Candido Mcqueen             1965   Location  [facility]  MRN [id number]                Chief Complaint   Patient presents with    New Patient     N Right Knee/ Cyst refer Dr Ayala          History of Present Illness  Candido Mcqueen is a 60 y.o. male who presents with  right knee pain, .    Sent in consultation by Angelica Rivera MD, .      Injury Mechanism:  none.  Worker's Comp. & legal issues:   none.  Previous Treatments: Ice, Heat, and NSAIDs    Patient presents to the office today for a new problem.  Patient here with a chief complaint of right anterior knee mass.  Patient has had pain for 2 years.  No specific injury or trauma.  She does feel over time where the mass is getting bigger.  It only hurts when he has direct trauma over the region.  He denies any drainage.    Past History  Past Medical History:   Diagnosis Date    Kidney stone 8/5/2018    Kidney stones      Past Surgical History:   Procedure Laterality Date    COLONOSCOPY  02/09/2018    normal    KIDNEY STONE SURGERY      x3 ureteroscope and stent    TONSILLECTOMY       Social History     Tobacco Use    Smoking status: Never    Smokeless tobacco: Never   Substance Use Topics    Alcohol use: No      Current Outpatient Medications on File Prior to Visit   Medication Sig Dispense Refill    Multiple Vitamins-Minerals (THERAPEUTIC MULTIVITAMIN-MINERALS) tablet Take 1 tablet by mouth daily      atorvastatin (LIPITOR) 40 MG tablet Take 1 tablet by mouth daily 90 tablet 3     No current facility-administered medications on file prior to visit.      ASCVD 10-YEAR RISK SCORE  The ASCVD Risk score (Lila DK, et al., 2019) failed to calculate for the following reasons:    The valid total cholesterol range is 130 to 320 mg/dL     Review of Systems  10-point ROS is negative other than HPI.    Physical Exam  Based off 1997 Exam Criteria  Ht 1.753 m (5' 9.02\")   Wt 82.1 kg (181 lb)

## 2025-06-20 ENCOUNTER — TELEPHONE (OUTPATIENT)
Dept: ORTHOPEDIC SURGERY | Age: 60
End: 2025-06-20

## 2025-06-20 NOTE — TELEPHONE ENCOUNTER
MRI RIGHT KNEE approved Auth# [id number]     Was approved for [facility]   Valid 6/20/2025 - 7/19/2025

## 2025-07-29 ENCOUNTER — CLINICAL SUPPORT (OUTPATIENT)
Dept: PRIMARY CARE CLINIC | Age: 60
End: 2025-07-29
Payer: COMMERCIAL

## 2025-07-29 ENCOUNTER — OFFICE VISIT (OUTPATIENT)
Dept: ORTHOPEDIC SURGERY | Age: 60
End: 2025-07-29
Payer: COMMERCIAL

## 2025-07-29 VITALS — HEIGHT: 69 IN | WEIGHT: 181 LBS | BODY MASS INDEX: 26.81 KG/M2

## 2025-07-29 DIAGNOSIS — R22.41 KNEE MASS, RIGHT: Primary | ICD-10-CM

## 2025-07-29 PROCEDURE — 90750 HZV VACC RECOMBINANT IM: CPT | Performed by: FAMILY MEDICINE

## 2025-07-29 PROCEDURE — 99214 OFFICE O/P EST MOD 30 MIN: CPT | Performed by: ORTHOPAEDIC SURGERY

## 2025-07-29 PROCEDURE — 90471 IMMUNIZATION ADMIN: CPT | Performed by: FAMILY MEDICINE

## 2025-07-29 NOTE — PROGRESS NOTES
Dr Mohan Duke      Date /Time 7/29/2025       4:13 PM EDT  Name Candido Mcqueen             1965   Location  [facility]  MRN [id number]                Chief Complaint   Patient presents with    Follow-up     TR MRI Right Knee          History of Present Illness  Candido Mcqueen is a 60 y.o. male who presents with  right knee pain, .    Sent in consultation by Angelica Rivera MD, .      Injury Mechanism:  none.  Worker's Comp. & legal issues:   none.  Previous Treatments: Ice, Heat, and NSAIDs    Patient presents to the office today for a follow-up visit.  Patient had an MRI ordered with and without contrast at last visit to evaluate a new mass.  He is here to review results.    Previous history: Patient presents to the office today for a new problem.  Patient here with a chief complaint of right anterior knee mass.  Patient has had pain for 2 years.  No specific injury or trauma.  She does feel over time where the mass is getting bigger.  It only hurts when he has direct trauma over the region.  He denies any drainage.    Past History  Past Medical History:   Diagnosis Date    Kidney stone 8/5/2018    Kidney stones      Past Surgical History:   Procedure Laterality Date    COLONOSCOPY  02/09/2018    normal    KIDNEY STONE SURGERY      x3 ureteroscope and stent    TONSILLECTOMY       Social History     Tobacco Use    Smoking status: Never    Smokeless tobacco: Never   Substance Use Topics    Alcohol use: No      Current Outpatient Medications on File Prior to Visit   Medication Sig Dispense Refill    Multiple Vitamins-Minerals (THERAPEUTIC MULTIVITAMIN-MINERALS) tablet Take 1 tablet by mouth daily      atorvastatin (LIPITOR) 40 MG tablet Take 1 tablet by mouth daily 90 tablet 3     No current facility-administered medications on file prior to visit.      ASCVD 10-YEAR RISK SCORE  The ASCVD Risk score (Lial DK, et al., 2019) failed to calculate for the following reasons:    The valid total